# Patient Record
Sex: MALE | Race: WHITE | NOT HISPANIC OR LATINO | Employment: FULL TIME | URBAN - METROPOLITAN AREA
[De-identification: names, ages, dates, MRNs, and addresses within clinical notes are randomized per-mention and may not be internally consistent; named-entity substitution may affect disease eponyms.]

---

## 2019-07-18 ENCOUNTER — OFFICE VISIT (OUTPATIENT)
Dept: PODIATRY | Facility: CLINIC | Age: 54
End: 2019-07-18
Payer: COMMERCIAL

## 2019-07-18 VITALS
HEART RATE: 94 BPM | TEMPERATURE: 99 F | BODY MASS INDEX: 26.68 KG/M2 | HEIGHT: 72 IN | SYSTOLIC BLOOD PRESSURE: 115 MMHG | WEIGHT: 197 LBS | DIASTOLIC BLOOD PRESSURE: 71 MMHG

## 2019-07-18 DIAGNOSIS — M20.62 ACQUIRED DEFORMITY OF LEFT TOE: ICD-10-CM

## 2019-07-18 DIAGNOSIS — E11.49 TYPE II DIABETES MELLITUS WITH NEUROLOGICAL MANIFESTATIONS: Primary | ICD-10-CM

## 2019-07-18 DIAGNOSIS — M72.2 PLANTAR FASCIITIS: ICD-10-CM

## 2019-07-18 DIAGNOSIS — B35.1 ONYCHOMYCOSIS DUE TO DERMATOPHYTE: ICD-10-CM

## 2019-07-18 DIAGNOSIS — G57.90 NEURITIS OF FOOT, UNSPECIFIED LATERALITY: ICD-10-CM

## 2019-07-18 PROCEDURE — 99999 PR PBB SHADOW E&M-NEW PATIENT-LVL III: ICD-10-PCS | Mod: PBBFAC,,, | Performed by: PODIATRIST

## 2019-07-18 PROCEDURE — 99999 PR PBB SHADOW E&M-NEW PATIENT-LVL III: CPT | Mod: PBBFAC,,, | Performed by: PODIATRIST

## 2019-07-18 PROCEDURE — 99203 OFFICE O/P NEW LOW 30 MIN: CPT | Mod: S$GLB,,, | Performed by: PODIATRIST

## 2019-07-18 PROCEDURE — 99203 PR OFFICE/OUTPT VISIT, NEW, LEVL III, 30-44 MIN: ICD-10-PCS | Mod: S$GLB,,, | Performed by: PODIATRIST

## 2019-07-18 RX ORDER — SIMVASTATIN 40 MG/1
TABLET, FILM COATED ORAL
COMMUNITY

## 2019-07-18 RX ORDER — DICLOFENAC SODIUM 10 MG/G
2 GEL TOPICAL 3 TIMES DAILY
Qty: 100 G | Refills: 4 | Status: SHIPPED | OUTPATIENT
Start: 2019-07-18

## 2019-07-18 RX ORDER — OMEPRAZOLE 40 MG/1
CAPSULE, DELAYED RELEASE ORAL
COMMUNITY

## 2019-07-18 RX ORDER — METFORMIN HYDROCHLORIDE 1000 MG/1
TABLET ORAL
COMMUNITY

## 2019-07-18 RX ORDER — GLIMEPIRIDE 4 MG/1
TABLET ORAL
COMMUNITY

## 2019-07-18 RX ORDER — CYANOCOBALAMIN (VITAMIN B-12) 500 MCG
TABLET ORAL
COMMUNITY

## 2019-07-18 RX ORDER — ASPIRIN 81 MG/1
81 TABLET ORAL DAILY
COMMUNITY

## 2019-07-18 RX ORDER — ENALAPRIL MALEATE 2.5 MG/1
TABLET ORAL
COMMUNITY

## 2019-07-18 NOTE — LETTER
July 21, 2019      Morenita Schrader NP  [address] [place] [address]           [facility]  [address] [place] [address]  Phone: [phone]  Fax: [phone]          Patient: Anupam Mayberry   MR Number: [id number]   YOB: 1965   Date of Visit: 7/18/2019       Dear Morenita Schrader:    Thank you for referring Anupam Mayberry to me for evaluation. Attached you will find relevant portions of my assessment and plan of care.    If you have questions, please do not hesitate to call me. I look forward to following Anupam Mayberry along with you.    Sincerely,    Citlali Villarreal, HENRY    Enclosure  CC:  No Recipients    If you would like to receive this communication electronically, please contact [email or web address] or [phone] to request more information on [employer] Link access.    For providers and/or their staff who would like to refer a patient to [facility], please contact us through our one-stop-shop provider referral line, [facility], at [phone].    If you feel you have received this communication in error or would no longer like to receive these types of communications, please e-mail [email or web address]

## 2019-07-21 NOTE — PROGRESS NOTES
Subjective:      Patient ID: Anupam Mayberry is a 53 y.o. male.    Chief Complaint: Diabetes Mellitus; Foot Pain; Foot Problem; and Nail Problem  Patient presents with complaint of burning and tingling in both feet, diabetic foot exam, pain between 4th and 5th digits left foot and problems with toenails.  Reports a 8-10 year history of type 2 diabetes, believes his A1c is around 7.5, does not have a glucometer to check his daily glucose.  Sees Raven Schrader NP at [facility].  States he has pain mostly on heels and ball of both feet, painful area and 5th digit left foot is fairly recent.   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  Pain level 4/ 10      ROS   Constitutional    Pleasant, well-nourished, no distress, well oriented    Cardiovascular          No chest pain, no shortness of breath,  no swelling in the extremities    Respiratory         No cough, no congestion     Musculoskeletal         No muscle aches, no arthralgias/joint pain, no back pain    Neurologic         Burning and tingling in feet     Endocrine          DIABETES        Objective:      Physical Exam   Cardiovascular:   Pulses:       Dorsalis pedis pulses are 2+ on the right side, and 2+ on the left side.        Posterior tibial pulses are 2+ on the right side, and 2+ on the left side.   Musculoskeletal:        Right foot: There is decreased range of motion.        Left foot: There is decreased range of motion.   Feet:   Right Foot:   Protective Sensation: 6 sites tested. 6 sites sensed.   Skin Integrity: Negative for skin breakdown or callus.   Left Foot:   Protective Sensation: 6 sites tested. 6 sites sensed.   Skin Integrity: Negative for skin breakdown or callus.   Vascular         Normal CFT bilateral   No lower extremity edema bilateral   Pedal skin temperature and color are normal bilateral      Integumentary   Hyperkeratotic tissue with brown discoloration in the center on the medial aspect of the 5th digit left foot.  Callus is fairly smooth, mild discomfort, no edema or evidence of infection  Various degrees of fungal nails, few tender upon palpation, some discoloration, thickness reduced with no evidence of infection  Skin overall is in good condition, no skin breaks, bruises, abrasions bilateral feet          Neurological   Gross sensation intact in all areas bilateral feet with monofilament testing, some tenderness through the ball of both feet with mild acute neuritis    Musculoskeletal   Muscle Strength/Testing and Tone:  Intact, normal tone bilateral   Joints, Bones, and Muscles:  Cavus foot type with mild plantar fascial insertion pain bilateral        Walks well unassisted        Presents in appropriate shoes          Assessment:       Encounter Diagnoses   Name Primary?    Type II diabetes mellitus with neurological manifestations Yes    Plantar fasciitis     Neuritis of foot, unspecified laterality     Acquired deformity of left toe     Onychomycosis due to dermatophyte          Plan:       Anupam was seen today for diabetes mellitus, foot pain, foot problem and nail problem.    Diagnoses and all orders for this visit:    Type II diabetes mellitus with neurological manifestations    Plantar fasciitis    Neuritis of foot, unspecified laterality    Acquired deformity of left toe    Onychomycosis due to dermatophyte    Other orders  -     diclofenac sodium (VOLTAREN) 1 % Gel; Apply 2 g topically 3 (three) times daily.      Diabetic pedal exam performed.  Reviewed diabetic education, neuropathy,  good feeling in his feet.  Explained pain he experiences may not be diabetic neuropathy.  Explained his foot type and structure puts him at risk for developing neuritis through the ball of the foot which is an overuse condition causing inflammation around the nerves.  We discussed mild plantar  fasciitis, also inflammation at the plantar fascial insertion.  Explained treating these 2 locations may resolve burning and tingling in his feet.  Discussed benefit of tight(er) control of glucose/diabetes regarding potential foot problems especially neuropathy.    Had a lengthy discussion regarding proper shoes and arch support to support high arches and help take pressure off both areas of pain, both the ball of the foot in the heel. Reviewed need for proper bracing of the plantar fascia utilizing an arch support.  Discussed example of firm arch support, how to utilize it properly in tennis shoes, and how to adjust to wearing it comfortably.  Once comfortable this is to be worn at all times of weightbearing until pain has resolved, then majority of the time for stressful activities, work  and exercise, prolonged walking or standing, worn at all times possible to prevent reoccurrence. Discussed other appropriate shoes for indoors, shoes at bedside, absolutely no flat shoes or walking barefoot.    Reviewed stretching and frequency this should be done daily.    Reviewed treatments for inflammation. Discussed ice/cool therapy and frequency this should be performed.    Prescribed Voltaren gel and explained how to apply daily.    Discussed over-the-counter oral NSAID as directed for 5-7 days, with food, discontinue if stomach upset.  Discussed maintenance of skin and nails, callus left 5th digit,  better maintenance of nails and potential complications.  Dispensed silopost has popped sock to use on the 4th digit left foot to keep pressure off between the toes and hopefully resolve remaining callus.  Instructed to remove each night, do not wear if uncomfortable  Reviewed need for daily foot checks and instructed patient to contact the office with any area of redness or swelling which has not improved within 3 days.  Patient was in understanding and agreement with treatment plan  I counseled the patient on his  conditions, their implications and medical management.  Instructed patient to contact the office with any changes, questions, concerns, worsening of symptoms. Patient verbalized understanding.   Total face-to-face time, exam, assessment, treatment, discussion, documentation 30 minutes, more than half this time spent on consultation and coordination of care.  Follow up 2 weeks      This note was created using [employer] voice recognition software that occasionally misinterpreted phrases or words.